# Patient Record
Sex: FEMALE | Race: WHITE | NOT HISPANIC OR LATINO | Employment: OTHER | URBAN - METROPOLITAN AREA
[De-identification: names, ages, dates, MRNs, and addresses within clinical notes are randomized per-mention and may not be internally consistent; named-entity substitution may affect disease eponyms.]

---

## 2018-03-08 NOTE — PATIENT DISCUSSION
Posterior Capsular Fibrosis/Opacification Counseling: I have discussed the option of glasses versus YAG laser surgery versus  following. It was explained that when vision no longer meets the patient's needs, and when a new prescription for glasses is not likely to improve the patient's visual symptoms, the option of YAG laser surgery is a reasonable next step. It was  explained that there is no guarantee that removing the PCF/PCO will improve their visual symptoms. The risks, benefits and alternatives of surgery were discussed with the patient. The uncommon risk of an increase in intraocular pressure or a retinal detachment and their associated symptoms were explained to the patient. After this discussion, the patient desires to proceed with YAG laser to improve vision for reading and using the computer.

## 2018-03-08 NOTE — PATIENT DISCUSSION
POSTERIOR CAPSULAR FIBROSIS/OPACIFICATION, OU - VISUALLY SIGNIFICANT OS.  SCHEDULE YAG CAPSULOTOMY OS

## 2018-06-13 ENCOUNTER — ESTABLISHED COMPREHENSIVE EXAM (OUTPATIENT)
Dept: URBAN - METROPOLITAN AREA CLINIC 40 | Facility: CLINIC | Age: 58
End: 2018-06-13

## 2018-06-13 DIAGNOSIS — H35.3130: ICD-10-CM

## 2018-06-13 DIAGNOSIS — H35.363: ICD-10-CM

## 2018-06-13 DIAGNOSIS — H52.03: ICD-10-CM

## 2018-06-13 DIAGNOSIS — E11.9: ICD-10-CM

## 2018-06-13 DIAGNOSIS — H25.813: ICD-10-CM

## 2018-06-13 PROCEDURE — G8427 DOCREV CUR MEDS BY ELIG CLIN: HCPCS

## 2018-06-13 PROCEDURE — 4177F TALK PT/CRGVR RE AREDS PREV: CPT

## 2018-06-13 PROCEDURE — 2022F DILAT RTA XM EVC RTNOPTHY: CPT

## 2018-06-13 PROCEDURE — 92014 COMPRE OPH EXAM EST PT 1/>: CPT

## 2018-06-13 PROCEDURE — 1036F TOBACCO NON-USER: CPT

## 2018-06-13 PROCEDURE — 2019F DILATED MACUL EXAM DONE: CPT

## 2018-06-13 PROCEDURE — 92015 DETERMINE REFRACTIVE STATE: CPT

## 2018-06-13 PROCEDURE — 92499OP2 OPTOMAP RETINAL SCREENING BOTH EYES

## 2018-06-13 ASSESSMENT — VISUAL ACUITY
OU_CC: 20/25-1
OS_CC: J5
OD_CC: J4
OS_CC: 20/40-1
OU_CC: J3
OD_CC: 20/25-1

## 2018-06-13 ASSESSMENT — KERATOMETRY
OD_K1POWER_DIOPTERS: 44.75
OS_K2POWER_DIOPTERS: 46.25
OD_K2POWER_DIOPTERS: 45.50
OS_K1POWER_DIOPTERS: 45.50
OD_AXISANGLE2_DEGREES: 086
OD_AXISANGLE_DEGREES: 176
OS_AXISANGLE_DEGREES: 180
OS_AXISANGLE2_DEGREES: 090

## 2018-06-13 ASSESSMENT — TONOMETRY
OS_IOP_MMHG: 118
OD_IOP_MMHG: 18

## 2019-07-30 ENCOUNTER — ESTABLISHED COMPREHENSIVE EXAM (OUTPATIENT)
Dept: URBAN - METROPOLITAN AREA CLINIC 40 | Facility: CLINIC | Age: 59
End: 2019-07-30

## 2019-07-30 DIAGNOSIS — E11.9: ICD-10-CM

## 2019-07-30 DIAGNOSIS — H35.363: ICD-10-CM

## 2019-07-30 DIAGNOSIS — H35.3130: ICD-10-CM

## 2019-07-30 DIAGNOSIS — H25.813: ICD-10-CM

## 2019-07-30 PROCEDURE — 92014 COMPRE OPH EXAM EST PT 1/>: CPT

## 2019-07-30 PROCEDURE — 92250 FUNDUS PHOTOGRAPHY W/I&R: CPT

## 2019-07-30 PROCEDURE — 92015 DETERMINE REFRACTIVE STATE: CPT

## 2019-07-30 PROCEDURE — 92134 CPTRZ OPH DX IMG PST SGM RTA: CPT

## 2019-07-30 ASSESSMENT — KERATOMETRY
OS_AXISANGLE_DEGREES: 180
OD_AXISANGLE_DEGREES: 176
OD_K2POWER_DIOPTERS: 45.50
OS_K1POWER_DIOPTERS: 45.50
OD_K1POWER_DIOPTERS: 44.75
OD_AXISANGLE2_DEGREES: 086
OS_AXISANGLE2_DEGREES: 090
OS_K2POWER_DIOPTERS: 46.25

## 2019-07-30 ASSESSMENT — TONOMETRY
OS_IOP_MMHG: 18
OD_IOP_MMHG: 18

## 2019-07-30 ASSESSMENT — VISUAL ACUITY
OS_BAT: 20/200
OS_CC: 20/60-2
OS_PH: 20/200
OU_CC: 20/30
OD_CC: J5
OU_CC: J5
OD_BAT: 20/60
OD_CC: 20/30

## 2020-05-12 NOTE — PATIENT DISCUSSION
POSTERIOR CAPSULAR FIBROSIS/OPACIFICATION, OD - VISUALLY SIGNIFICANT.   SCHEDULE YAG CAPSULOTOMY OD.

## 2020-05-12 NOTE — PATIENT DISCUSSION
Posterior Capsular Fibrosis/Opacification Counseling: I have discussed the option of glasses versus YAG laser surgery versus  following. It was explained that when vision no longer meets the patient's needs, and when a new prescription for glasses is not likely to improve the patient's visual symptoms, the option of YAG laser surgery is a reasonable next step. It was  explained that there is no guarantee that removing the PCF/PCO will improve their visual symptoms. The risks, benefits and alternatives of surgery were discussed with the patient. The uncommon risk of an increase in intraocular pressure or a retinal detachment and their associated symptoms were explained to the patient. After this discussion, the patient desires to proceed with YAG laser to improve vision for glare while driving at night.

## 2020-06-30 NOTE — PATIENT DISCUSSION
RECOMMEND GLASSES OR CONTACT LENSES FOR DRIVING. THE PT IS INTERESTED IN A CONTACT LENS FITTING FOR THE RIGHT EYE WITH DR. DE PAZ IN [place].

## 2020-06-30 NOTE — PATIENT DISCUSSION
ASTIGMATISM OD - LIMITING HER UNAIDED DISTANCE VISION. CONSIDER LASIK MEASUREMENT IF THE PATIENT IS INTERESTED.

## 2020-08-04 NOTE — PATIENT DISCUSSION
S/P PCIOL / YAG, OD WITH RESIDUAL REFRACTIVE ERROR AND ASTIGMATISM. DUE TO POSTERIOR ELEVATION ON TOPOGRAPHY MAPS WERE REVIEWED BY DR. Shyann Steinberg &amp; DR. [facility]; [address] IS WARRANTED W/ CAUTION 2' RISK OF ECTASIA. PT WAS EDU ON PRK, ITS SIDE EFFECTS, SLOWER HEALING RATE AND ECTASIA RISKS. AFTER A LONG DISCUSSION PT ELECTS TO CONTINUE W/ GLASSES OR CLS AND DOES NOT WISH TO PROCEED W/ [address]. RELEASE TO DR. DE PAZ FOR ROUTINE CARE.

## 2020-09-30 ENCOUNTER — ESTABLISHED COMPREHENSIVE EXAM (OUTPATIENT)
Dept: URBAN - METROPOLITAN AREA CLINIC 40 | Facility: CLINIC | Age: 60
End: 2020-09-30

## 2020-09-30 DIAGNOSIS — H35.363: ICD-10-CM

## 2020-09-30 DIAGNOSIS — H25.813: ICD-10-CM

## 2020-09-30 DIAGNOSIS — H52.4: ICD-10-CM

## 2020-09-30 DIAGNOSIS — H52.03: ICD-10-CM

## 2020-09-30 DIAGNOSIS — H35.3130: ICD-10-CM

## 2020-09-30 DIAGNOSIS — E11.9: ICD-10-CM

## 2020-09-30 PROCEDURE — 92014 COMPRE OPH EXAM EST PT 1/>: CPT

## 2020-09-30 PROCEDURE — 92015 DETERMINE REFRACTIVE STATE: CPT

## 2020-09-30 PROCEDURE — 92250 FUNDUS PHOTOGRAPHY W/I&R: CPT

## 2020-09-30 ASSESSMENT — KERATOMETRY
OS_K2POWER_DIOPTERS: 46.25
OD_K2POWER_DIOPTERS: 45.50
OD_K2POWER_DIOPTERS: 45.75
OS_AXISANGLE2_DEGREES: 85
OD_AXISANGLE2_DEGREES: 086
OD_AXISANGLE2_DEGREES: 80
OD_AXISANGLE_DEGREES: 176
OS_AXISANGLE2_DEGREES: 090
OS_K1POWER_DIOPTERS: 45.5
OS_K1POWER_DIOPTERS: 45.50
OS_K2POWER_DIOPTERS: 45.75
OS_AXISANGLE_DEGREES: 175
OS_AXISANGLE_DEGREES: 180
OD_AXISANGLE_DEGREES: 170
OD_K1POWER_DIOPTERS: 45.25
OD_K1POWER_DIOPTERS: 44.75

## 2020-09-30 ASSESSMENT — TONOMETRY
OS_IOP_MMHG: 15
OD_IOP_MMHG: 17

## 2020-09-30 ASSESSMENT — VISUAL ACUITY
OS_PH: 20/300
OD_BAT: 20/40
OD_CC: J6
OU_CC: 20/30
OU_CC: J6
OD_CC: 20/30-1
OS_CC: 20/60

## 2021-10-06 ENCOUNTER — ESTABLISHED COMPREHENSIVE EXAM (OUTPATIENT)
Dept: URBAN - METROPOLITAN AREA CLINIC 40 | Facility: CLINIC | Age: 61
End: 2021-10-06

## 2021-10-06 DIAGNOSIS — H35.3130: ICD-10-CM

## 2021-10-06 DIAGNOSIS — H52.03: ICD-10-CM

## 2021-10-06 DIAGNOSIS — H35.363: ICD-10-CM

## 2021-10-06 DIAGNOSIS — H25.813: ICD-10-CM

## 2021-10-06 DIAGNOSIS — E11.9: ICD-10-CM

## 2021-10-06 PROCEDURE — 92015 DETERMINE REFRACTIVE STATE: CPT

## 2021-10-06 PROCEDURE — 92014 COMPRE OPH EXAM EST PT 1/>: CPT

## 2021-10-06 PROCEDURE — 92250 FUNDUS PHOTOGRAPHY W/I&R: CPT

## 2021-10-06 ASSESSMENT — VISUAL ACUITY
OS_CC: 20/70-1
OD_CC: 20/40
OS_PH: 20/400
OS_BAT: 20/300
OD_PH: 20/50
OU_CC: J5
OD_CC: J5
OD_BAT: 20/50
OU_CC: 20/40

## 2021-10-06 ASSESSMENT — KERATOMETRY
OS_K1POWER_DIOPTERS: 45.5
OS_AXISANGLE_DEGREES: 175
OD_AXISANGLE2_DEGREES: 086
OD_AXISANGLE2_DEGREES: 80
OD_K1POWER_DIOPTERS: 45.25
OS_K1POWER_DIOPTERS: 45.50
OS_K2POWER_DIOPTERS: 45.75
OS_AXISANGLE2_DEGREES: 85
OD_K2POWER_DIOPTERS: 45.75
OD_AXISANGLE_DEGREES: 170
OS_K2POWER_DIOPTERS: 46.25
OS_AXISANGLE2_DEGREES: 090
OD_K2POWER_DIOPTERS: 45.50
OS_AXISANGLE_DEGREES: 180
OD_K1POWER_DIOPTERS: 44.75
OD_AXISANGLE_DEGREES: 176

## 2021-10-06 ASSESSMENT — TONOMETRY
OD_IOP_MMHG: 10
OS_IOP_MMHG: 10

## 2022-10-07 ENCOUNTER — COMPREHENSIVE EXAM (OUTPATIENT)
Dept: URBAN - METROPOLITAN AREA CLINIC 40 | Facility: CLINIC | Age: 62
End: 2022-10-07

## 2022-10-07 DIAGNOSIS — H35.363: ICD-10-CM

## 2022-10-07 DIAGNOSIS — H52.4: ICD-10-CM

## 2022-10-07 DIAGNOSIS — H25.813: ICD-10-CM

## 2022-10-07 DIAGNOSIS — H52.03: ICD-10-CM

## 2022-10-07 DIAGNOSIS — H35.3130: ICD-10-CM

## 2022-10-07 DIAGNOSIS — E11.9: ICD-10-CM

## 2022-10-07 DIAGNOSIS — H31.013: ICD-10-CM

## 2022-10-07 PROCEDURE — 92250 FUNDUS PHOTOGRAPHY W/I&R: CPT

## 2022-10-07 PROCEDURE — 92015 DETERMINE REFRACTIVE STATE: CPT

## 2022-10-07 PROCEDURE — 92014 COMPRE OPH EXAM EST PT 1/>: CPT

## 2022-10-07 ASSESSMENT — KERATOMETRY
OS_AXISANGLE_DEGREES: 175
OD_K1POWER_DIOPTERS: 45.25
OS_K1POWER_DIOPTERS: 45.5
OS_AXISANGLE2_DEGREES: 090
OD_AXISANGLE_DEGREES: 170
OS_AXISANGLE2_DEGREES: 85
OD_AXISANGLE_DEGREES: 176
OS_K2POWER_DIOPTERS: 45.75
OD_AXISANGLE2_DEGREES: 80
OD_AXISANGLE2_DEGREES: 086
OS_AXISANGLE_DEGREES: 180
OD_K2POWER_DIOPTERS: 45.75
OD_K1POWER_DIOPTERS: 44.75
OS_K2POWER_DIOPTERS: 46.25
OD_K2POWER_DIOPTERS: 45.50
OS_K1POWER_DIOPTERS: 45.50

## 2022-10-07 ASSESSMENT — TONOMETRY
OS_IOP_MMHG: 14
OD_IOP_MMHG: 14

## 2022-10-07 ASSESSMENT — VISUAL ACUITY
OS_CC: 20/400-
OD_SC: J10
OD_SC: 20/70-1
OS_SC: 20/400
OD_CC: 20/50-1
OD_CC: J5-
OS_SC: <J12
OS_CC: <J12

## 2023-01-26 ASSESSMENT — KERATOMETRY
OS_K1POWER_DIOPTERS: 45.50
OD_K2POWER_DIOPTERS: 45.75
OS_AXISANGLE2_DEGREES: 85
OD_K1POWER_DIOPTERS: 44.75
OD_K1POWER_DIOPTERS: 45.25
OD_AXISANGLE2_DEGREES: 086
OS_AXISANGLE2_DEGREES: 090
OS_K2POWER_DIOPTERS: 46.25
OS_K1POWER_DIOPTERS: 45.5
OD_AXISANGLE2_DEGREES: 80
OS_K2POWER_DIOPTERS: 45.75
OD_K2POWER_DIOPTERS: 45.50
OS_AXISANGLE_DEGREES: 175
OD_AXISANGLE_DEGREES: 176
OD_AXISANGLE_DEGREES: 170
OS_AXISANGLE_DEGREES: 180

## 2023-01-27 ENCOUNTER — FOLLOW UP (OUTPATIENT)
Dept: URBAN - METROPOLITAN AREA CLINIC 40 | Facility: CLINIC | Age: 63
End: 2023-01-27

## 2023-01-27 DIAGNOSIS — H35.3130: ICD-10-CM

## 2023-01-27 DIAGNOSIS — H31.013: ICD-10-CM

## 2023-01-27 DIAGNOSIS — H35.363: ICD-10-CM

## 2023-01-27 PROCEDURE — 99213 OFFICE O/P EST LOW 20 MIN: CPT

## 2023-01-27 PROCEDURE — 92134 CPTRZ OPH DX IMG PST SGM RTA: CPT

## 2023-01-27 ASSESSMENT — VISUAL ACUITY
OD_CC: 20/50
OU_CC: 20/50
OS_CC: 20/400

## 2023-01-27 ASSESSMENT — TONOMETRY
OS_IOP_MMHG: 13
OD_IOP_MMHG: 13